# Patient Record
Sex: MALE | Race: WHITE | Employment: FULL TIME | URBAN - METROPOLITAN AREA
[De-identification: names, ages, dates, MRNs, and addresses within clinical notes are randomized per-mention and may not be internally consistent; named-entity substitution may affect disease eponyms.]

---

## 2021-07-11 ENCOUNTER — HOSPITAL ENCOUNTER (EMERGENCY)
Age: 41
Discharge: HOME | End: 2021-07-11
Attending: EMERGENCY MEDICINE
Payer: MEDICAID

## 2021-07-11 VITALS
WEIGHT: 281.53 LBS | SYSTOLIC BLOOD PRESSURE: 127 MMHG | HEART RATE: 75 BPM | RESPIRATION RATE: 20 BRPM | TEMPERATURE: 98.4 F | HEIGHT: 75 IN | OXYGEN SATURATION: 92 % | DIASTOLIC BLOOD PRESSURE: 73 MMHG | BODY MASS INDEX: 35 KG/M2

## 2021-07-11 DIAGNOSIS — H20.9 TRAUMATIC IRITIS: ICD-10-CM

## 2021-07-11 DIAGNOSIS — S05.01XA ABRASION OF RIGHT CORNEA, INITIAL ENCOUNTER: Primary | ICD-10-CM

## 2021-07-11 PROCEDURE — 74011000250 HC RX REV CODE- 250: Performed by: EMERGENCY MEDICINE

## 2021-07-11 PROCEDURE — 99283 EMERGENCY DEPT VISIT LOW MDM: CPT

## 2021-07-11 RX ORDER — TETRACAINE HYDROCHLORIDE 5 MG/ML
1 SOLUTION OPHTHALMIC
Status: COMPLETED | OUTPATIENT
Start: 2021-07-11 | End: 2021-07-11

## 2021-07-11 RX ORDER — ERYTHROMYCIN 5 MG/G
0.1 OINTMENT OPHTHALMIC EVERY 6 HOURS
Qty: 3 TUBE | Refills: 0 | Status: SHIPPED | OUTPATIENT
Start: 2021-07-11 | End: 2021-07-18

## 2021-07-11 RX ADMIN — TETRACAINE HYDROCHLORIDE 1 DROP: 5 SOLUTION OPHTHALMIC at 17:43

## 2021-07-11 RX ADMIN — FLUORESCEIN SODIUM 1 STRIP: 1 STRIP OPHTHALMIC at 17:43

## 2021-07-11 NOTE — ED TRIAGE NOTES
Pt. Was driving down the road  Last night with the window open and something flew into his left eye. Pt. Used eye drops and contact solution. Pt. Feel like theres something in his eye and he is photophobic.

## 2021-07-11 NOTE — ED NOTES
Reviewed d/c instructions with the patient and his wife, highlighting medication considerations, home care, the importance of medical follow-up and s&s requiring more immediate medical attention.

## 2021-07-12 NOTE — ED PROVIDER NOTES
The history is provided by the patient and the spouse. Foreign Body in Eye   This is a new problem. The current episode started more than 2 days ago. The problem occurs constantly. The problem has not changed since onset. The right eye is affected. The injury mechanism was a foreign body and a direct trauma. The pain is mild. There is history of trauma to the eye. There is no known exposure to pink eye. He does not wear contacts. Associated symptoms include discharge, foreign body sensation, photophobia, eye redness and pain. Pertinent negatives include no numbness, no blurred vision, no decreased vision, no double vision, no nausea, no vomiting, no tingling, no weakness, no itching, no fever, no blindness, no head injury and no dizziness. He has tried nothing for the symptoms. The treatment provided no relief. Past Medical History:   Diagnosis Date    Asthma     Diabetes ([address]     Gastrointestinal disorder     High cholesterol     Hypertension        Past Surgical History:   Procedure Laterality Date    HX HEENT      dental for dentures    HX ORTHOPAEDIC      r carpal tunnel    HX ORTHOPAEDIC      r elbow    ND CARDIAC SURG PROCEDURE UNLIST      ablations         History reviewed. No pertinent family history.     Social History     Socioeconomic History    Marital status:      Spouse name: Not on file    Number of children: Not on file    Years of education: Not on file    Highest education level: Not on file   Occupational History    Not on file   Tobacco Use    Smoking status: Former Smoker    Smokeless tobacco: Never Used   Substance and Sexual Activity    Alcohol use: Not on file     Comment: seldom    Drug use: Never    Sexual activity: Not on file   Other Topics Concern    Not on file   Social History Narrative    Not on file     Social Determinants of Health     Financial Resource Strain:     Difficulty of Paying Living Expenses:    Food Insecurity:     Worried About Running Out of Food in the Last Year:    [address] in the Last Year:    Transportation Needs:     Lack of Transportation (Medical):  Lack of Transportation (Non-Medical):    Physical Activity:     Days of Exercise per Week:     Minutes of Exercise per Session:    Stress:     Feeling of Stress :    Social Connections:     Frequency of Communication with Friends and Family:     Frequency of Social Gatherings with Friends and Family:     Attends [employer] Services:     Active Member of Clubs or Organizations:     Attends Club or Organization Meetings:     Marital Status:    Intimate Partner Violence:     Fear of Current or Ex-Partner:     Emotionally Abused:     Physically Abused:     Sexually Abused: ALLERGIES: Patient has no known allergies. Review of Systems   Constitutional: Negative for activity change, chills and fever. HENT: Negative for nosebleeds, sore throat, trouble swallowing and voice change. Eyes: Positive for photophobia, pain, discharge and redness. Negative for blindness, blurred vision, double vision and visual disturbance. Respiratory: Negative for shortness of breath. Cardiovascular: Negative for chest pain and palpitations. Gastrointestinal: Negative for abdominal pain, constipation, diarrhea, nausea and vomiting. Genitourinary: Negative for difficulty urinating, dysuria, hematuria and urgency. Musculoskeletal: Negative for back pain, neck pain and neck stiffness. Skin: Negative for color change and itching. Allergic/Immunologic: Negative for immunocompromised state. Neurological: Negative for dizziness, tingling, seizures, syncope, weakness, light-headedness, numbness and headaches. Psychiatric/Behavioral: Negative for behavioral problems, confusion, hallucinations, self-injury and suicidal ideas.        Vitals:    07/11/21 1703 07/11/21 1946   BP: 126/81 127/73   Pulse: 88 75   Resp: 16 20   Temp: 98.4 °F (36.9 °C)    SpO2: 95% 92% Weight: 127.7 kg (281 lb 8.4 oz)    Height: 6' 3\" (1.905 m)             Physical Exam  Vitals and nursing note reviewed. Constitutional:       General: He is not in acute distress. Appearance: He is well-developed. He is not diaphoretic. HENT:      Head: Atraumatic. Eyes:      General: Lids are normal.         Right eye: No foreign body, discharge or hordeolum. Extraocular Movements: Extraocular movements intact. Conjunctiva/sclera:      Right eye: Right conjunctiva is injected. Neck:      Trachea: No tracheal deviation. Cardiovascular:      Comments: Warm and well perfused  Pulmonary:      Effort: Pulmonary effort is normal. No respiratory distress. Musculoskeletal:         General: Normal range of motion. Skin:     General: Skin is warm and dry. Neurological:      Mental Status: He is alert. Coordination: Coordination normal.   Psychiatric:         Behavior: Behavior normal.         Thought Content: Thought content normal.         Judgment: Judgment normal.          MDM     This is a 51-year-old male with past medical history, review of systems, physical exam as above, presenting with complaints of right eye pain. Patient notes erythema, beginning approximately 4 days ago after mowing the lawn, presumed that he was struck by a foreign body. Patient states no initial discomfort, however pain and photophobia began today. He denies visual disturbance, double vision, headache, states he had a mild amount of discharge when waking this morning. He denies known exposure to conjunctivitis. Physical exam is remarkable for well-appearing obese middle-aged male, in no acute distress with extraocular movements intact, pupils equal and reactive, visual acuity at baseline. Pressures measured with mildly increased on the right side, fluorescein stain with mild abrasion. Discussed with patient unlikely acute angle glaucoma, rather more likely traumatic iritis with corneal abrasion. Will provide erythromycin ointment and encouraged him to follow-up with ophthalmology tomorrow. Return precautions given.     Procedures